# Patient Record
Sex: MALE | Race: WHITE
[De-identification: names, ages, dates, MRNs, and addresses within clinical notes are randomized per-mention and may not be internally consistent; named-entity substitution may affect disease eponyms.]

---

## 2018-01-10 ENCOUNTER — HOSPITAL ENCOUNTER (EMERGENCY)
Dept: HOSPITAL 25 - ED | Age: 58
Discharge: HOME | End: 2018-01-10
Payer: COMMERCIAL

## 2018-01-10 ENCOUNTER — HOSPITAL ENCOUNTER (EMERGENCY)
Dept: HOSPITAL 25 - UCEAST | Age: 58
Discharge: ANOTHER HOSPITAL | End: 2018-01-10
Payer: COMMERCIAL

## 2018-01-10 VITALS — DIASTOLIC BLOOD PRESSURE: 94 MMHG | SYSTOLIC BLOOD PRESSURE: 148 MMHG

## 2018-01-10 VITALS — SYSTOLIC BLOOD PRESSURE: 130 MMHG | DIASTOLIC BLOOD PRESSURE: 86 MMHG

## 2018-01-10 DIAGNOSIS — E86.0: ICD-10-CM

## 2018-01-10 DIAGNOSIS — R10.9: Primary | ICD-10-CM

## 2018-01-10 DIAGNOSIS — R63.0: ICD-10-CM

## 2018-01-10 DIAGNOSIS — R19.7: ICD-10-CM

## 2018-01-10 DIAGNOSIS — R50.9: ICD-10-CM

## 2018-01-10 DIAGNOSIS — I10: ICD-10-CM

## 2018-01-10 DIAGNOSIS — J18.9: Primary | ICD-10-CM

## 2018-01-10 LAB
BASOPHILS # BLD AUTO: 0 10^3/UL (ref 0–0.2)
EOSINOPHIL # BLD AUTO: 0 10^3/UL (ref 0–0.6)
HCT VFR BLD AUTO: 34 % (ref 42–52)
HGB BLD-MCNC: 11.9 G/DL (ref 14–18)
INR PPP/BLD: 1.21 (ref 0.77–1.02)
LYMPHOCYTES # BLD AUTO: 0.3 10^3/UL (ref 1–4.8)
MCH RBC QN AUTO: 31 PG (ref 27–31)
MCHC RBC AUTO-ENTMCNC: 35 G/DL (ref 31–36)
MCV RBC AUTO: 89 FL (ref 80–94)
MONOCYTES # BLD AUTO: 0.6 10^3/UL (ref 0–0.8)
NEUTROPHILS # BLD AUTO: 6.9 10^3/UL (ref 1.5–7.7)
NRBC # BLD AUTO: 0 10^3/UL
NRBC BLD QL AUTO: 0
PLATELET # BLD AUTO: 246 10^3/UL (ref 150–450)
RBC # BLD AUTO: 3.82 10^6/UL (ref 4–5.4)
WBC # BLD AUTO: 7.9 10^3/UL (ref 3.5–10.8)

## 2018-01-10 PROCEDURE — 87502 INFLUENZA DNA AMP PROBE: CPT

## 2018-01-10 PROCEDURE — 71045 X-RAY EXAM CHEST 1 VIEW: CPT

## 2018-01-10 PROCEDURE — 81003 URINALYSIS AUTO W/O SCOPE: CPT

## 2018-01-10 PROCEDURE — G0463 HOSPITAL OUTPT CLINIC VISIT: HCPCS

## 2018-01-10 PROCEDURE — 71275 CT ANGIOGRAPHY CHEST: CPT

## 2018-01-10 PROCEDURE — 80053 COMPREHEN METABOLIC PANEL: CPT

## 2018-01-10 PROCEDURE — 96374 THER/PROPH/DIAG INJ IV PUSH: CPT

## 2018-01-10 PROCEDURE — 36415 COLL VENOUS BLD VENIPUNCTURE: CPT

## 2018-01-10 PROCEDURE — 80074 ACUTE HEPATITIS PANEL: CPT

## 2018-01-10 PROCEDURE — 99285 EMERGENCY DEPT VISIT HI MDM: CPT

## 2018-01-10 PROCEDURE — 85025 COMPLETE CBC W/AUTO DIFF WBC: CPT

## 2018-01-10 PROCEDURE — 85730 THROMBOPLASTIN TIME PARTIAL: CPT

## 2018-01-10 PROCEDURE — 87040 BLOOD CULTURE FOR BACTERIA: CPT

## 2018-01-10 PROCEDURE — 74177 CT ABD & PELVIS W/CONTRAST: CPT

## 2018-01-10 PROCEDURE — 96375 TX/PRO/DX INJ NEW DRUG ADDON: CPT

## 2018-01-10 PROCEDURE — 93005 ELECTROCARDIOGRAM TRACING: CPT

## 2018-01-10 PROCEDURE — 76705 ECHO EXAM OF ABDOMEN: CPT

## 2018-01-10 PROCEDURE — 84484 ASSAY OF TROPONIN QUANT: CPT

## 2018-01-10 PROCEDURE — 83605 ASSAY OF LACTIC ACID: CPT

## 2018-01-10 PROCEDURE — 81015 MICROSCOPIC EXAM OF URINE: CPT

## 2018-01-10 PROCEDURE — 85610 PROTHROMBIN TIME: CPT

## 2018-01-10 PROCEDURE — 99212 OFFICE O/P EST SF 10 MIN: CPT

## 2018-01-10 PROCEDURE — 85379 FIBRIN DEGRADATION QUANT: CPT

## 2018-01-10 PROCEDURE — 87522 HEPATITIS C REVRS TRNSCRPJ: CPT

## 2018-01-10 RX ADMIN — SODIUM CHLORIDE ONE MLS/HR: 900 IRRIGANT IRRIGATION at 14:00

## 2018-01-10 NOTE — UC
Respiratory Complaint HPI





- HPI Summary


HPI Summary: 


Has been feeling badly for 10 days on/off fevers on/off still has soft loose 

stool up to 3 times a day








- History of Current Complaint


Chief Complaint: UCGeneralIllness


Stated Complaint: FLU SYMPTOMS


Time Seen by Provider: 01/10/18 11:02


Hx Obtained From: Patient


Onset/Duration: Gradual Onset


Timing: Constant


Severity Initially: Mild


Severity Currently: Moderate


Pain Intensity: 5


Aggravating Factors: Nothing


Alleviating Factors: Nothing


Associated Signs And Symptoms: Positive: Negative





- Allergies/Home Medications


Allergies/Adverse Reactions: 


 Allergies











Allergy/AdvReac Type Severity Reaction Status Date / Time


 


No Known Allergies Allergy   Verified 01/10/18 10:47














PMH/Surg Hx/FS Hx/Imm Hx


Previously Healthy: No





- Surgical History


Surgical History: None





- Family History


Known Family History: Positive: None





- Social History


Occupation: Employed Full-time


Lives: With Family


Alcohol Use: Weekly - 12 beers on weekends


Substance Use Type: None


Smoking Status (MU): Never Smoked Tobacco





- Immunization History


Most Recent Tetanus Shot: CANT RECALL





Review of Systems


Constitutional: Negative


Skin: Negative


Eyes: Negative


ENT: Negative


Respiratory: Negative


Cardiovascular: Negative


Gastrointestinal: Abdominal Pain, Diarrhea


Genitourinary: Negative


Motor: Negative


Neurovascular: Negative


Musculoskeletal: Negative


Neurological: Negative


Psychological: Negative


Is Patient Immunocompromised?: No


All Other Systems Reviewed And Are Negative: Yes





Physical Exam


Triage Information Reviewed: Yes


Appearance: Well-Nourished, Ill-Appearing, Pain Distress


Vital Signs: 


 Initial Vital Signs











Temp  100.5 F   01/10/18 10:43


 


Pulse  108   01/10/18 10:43


 


Resp  16   01/10/18 10:43


 


BP  128/76   01/10/18 10:43


 


Pulse Ox  97   01/10/18 10:43











Vital Signs Reviewed: Yes


Eye Exam: Normal


Eyes: Positive: Conjunctiva Clear


ENT Exam: Normal


ENT: Positive: Normal ENT inspection, Hearing grossly normal, Pharynx normal, 

TMs normal, Uvula midline.  Negative: Nasal congestion, Nasal drainage, 

Tonsillar swelling, Tonsillar exudate, Trismus, Muffled voice, Hoarse voice, 

Dental tenderness, Sinus tenderness


Dental Exam: Normal


Neck exam: Normal


Neck: Positive: Supple, Nontender, No Lymphadenopathy


Respiratory Exam: Normal


Respiratory: Positive: Chest non-tender, Lungs clear, Normal breath sounds, No 

respiratory distress, No accessory muscle use


Cardiovascular Exam: Normal


Cardiovascular: Positive: No Murmur, Pulses Normal, Brisk Capillary Refill, 

Tachycardia


Abdominal Exam: Normal


Abdomen Description: Positive: Soft, Hepatomegaly, Other: - pain ruq and luq.  

Negative: CVA Tenderness (R), CVA Tenderness (L)


Bowel Sounds: Positive: Present


Musculoskeletal Exam: Normal


Musculoskeletal: Positive: Strength Intact, ROM Intact, No Edema


Neurological Exam: Normal


Neurological: Positive: Alert, Muscle Tone Normal


Psychological Exam: Normal


Skin Exam: Normal


Skin: Positive: Other - face is flush





UC Diagnostic Evaluation





- Laboratory


O2 Sat by Pulse Oximetry: 97


Diagnostic Studies Comment: ua-dark yeloow  +1 Bili, Influenza A/B (-)





Respiratory Course/Dx





- Course


Course Of Treatment: To [facility] by Private care for further evaluation of 

tachycardia and abdomen pain





- Differential Dx/Diagnosis


Provider Diagnoses: abd pain





Discharge





- Discharge Plan


Condition: Fair


Disposition: OTHER


Discharge Disposition Comment: [facility] ED by private car


Referrals: 


Chris Man MD [Primary Care Provider] - 


Additional Instructions: 


Please go directly to emergency department for further evaluation of abdomen 

pain

## 2018-01-10 NOTE — ED
Antonia JEAN Gabriel, scribed for Manny Lujan MD on 01/10/18 at 1343 .





HPI Febrile Illness





- HPI Summary


HPI Summary: 





This patient is a 57 year old M presenting to [facility] with a chief complaint of a 

fever since 12/31/17.  The patient rates the pain 2/10 in severity. Patient 

reports loose/watery stool, dehydration, and loss of appetite. Patient denies 

pain, vomiting, cough, rhinorrhea, rash, and joint swelling. No antibiotic use 

in the past few months but has taken antipyretics, none today. Patient reports 

having only 1 BM per day. 





- History of Current Complaint


Chief Complaint: EDFever


Time Seen by Provider: 01/10/18 13:39


Hx Obtained From: Patient


Onset/Duration: Started Days Ago - 10


Timing: Constant


Pain Intensity: 2


Pain Scale Used: 0-10 Numeric


Alleviating Factors: OTC Medicine


Associated Signs and Symptoms: Diarrhea, Other: - loss of appetite





- Allergy/Home Medications


Allergies/Adverse Reactions: 


 Allergies











Allergy/AdvReac Type Severity Reaction Status Date / Time


 


No Known Allergies Allergy   Verified 01/10/18 10:47











Home Medications: 


 Home Medications





Multivitamins/Minerals TAB* [Theragran/minerals TAB*] 1 tab PO DAILY 01/10/18 [

History Confirmed 01/10/18]











PMH/Surg Hx/FS Hx/Imm Hx


Endocrine/Hematology History: 


   Denies: Hx Diabetes, Hx Thyroid Disease


Cardiovascular History: Reports: Hx Hypertension - no meds


Respiratory History: 


   Denies: Hx Asthma, Hx Chronic Obstructive Pulmonary Disease (COPD)


GI History: 


   Denies: Hx Ulcer


Infectious Disease History: No


Infectious Disease History: Reports: Hx Shingles - july 2013


   Denies: Hx Clostridium Difficile, Hx Hepatitis, Hx Human Immunodeficiency 

Virus (HIV), Hx of Known/Suspected MRSA, History Other Infectious Disease, 

Traveled Outside the [place] in Last 30 Days





- Family History


Known Family History: Positive: Other - Parkinsons 


   Negative: Cardiac Disease, Diabetes, Renal Disease





- Social History


Occupation: Employed Full-time


Lives: With Family


Alcohol Use: Weekly


Substance Use Type: Reports: None


Smoking Status (MU): Never Smoked Tobacco





Review of Systems


Positive: Fever, Other - dehydration 


Positive: Diarrhea, Other - loss of appetite


All Other Systems Reviewed And Are Negative: Yes





Physical Exam


Triage Information Reviewed: Yes


Vital Signs On Initial Exam: 


 Initial Vitals











Temp Pulse Resp BP Pulse Ox


 


 100.9 F   127   16   161/88   94 


 


 01/10/18 13:33  01/10/18 13:33  01/10/18 13:33  01/10/18 13:33  01/10/18 13:33











Vital Signs Reviewed: Yes


Appearance: Positive: Well-Appearing, No Pain Distress


Skin: Positive: Warm, Skin Color Reflects Adequate Perfusion


Head/Face: Positive: Normal Head/Face Inspection


Eyes: Positive: EOMI, Other: - no photophobia


ENT: Positive: Normal ENT inspection, Pharynx normal.  Negative: Nasal 

congestion


Neck: Positive: Supple, Nontender


Respiratory/Lung Sounds: Positive: Clear to Auscultation, Breath Sounds Present


Cardiovascular: Positive: Tachycardia.  Negative: Murmur


Abdomen Description: Positive: Nontender


Musculoskeletal: Positive: Strength/ROM Intact


Neurological: Positive: Sensory/Motor Intact, Alert, Oriented to Person Place, 

Time, CN Intact II-III, Speech Normal


Psychiatric: Positive: Normal





- [place] Coma Scale


Best Eye Response: 4 - Spontaneous


Best Motor Response: 6 - Obeys Commands


Best Verbal Response: 5 - Oriented





Diagnostics





- Vital Signs


 Vital Signs











  Temp Pulse Resp BP Pulse Ox


 


 01/10/18 13:33  100.9 F  127  16  161/88  94














- Laboratory


Result Diagrams: 


 01/10/18 14:27





 01/10/18 14:27


Lab Statement: Any lab studies that have been ordered have been reviewed, and 

results considered in the medical decision making process.





- Radiology


  ** CXR


Radiology Interpretation Completed By: Radiologist





- CT


  ** CTA Chest/ABD/Pelvis


CT Interpretation Completed By: Radiologist -  1.  Left lower lobe pneumonia. 

Suggest follow-up. 2.  Old granulomatous disease, unchanged. 3.  No CT evidence 

of acute pulmonary embolic disease. 4.  Hepatosplenomegaly. 5.  Moderate 

diverticula without CT evidence of acute diverticulitis.  ED physician has 

reviewed this radiology report.





- EKG


  ** 13:45


Cardiac Rate: Tachycardia


EKG Rhythm: Sinus Tachycardia - at 119 BPM


EKG Interpretation: No STEMI, normal RI, low voltage in extremity leads





- Additional Comments


Diagnostic Additional Comments: 





ABD US reveals, per radiologist, CHOLELITHIASIS. NO DUCTAL DILATATION 


ED physician has reviewed this radiology report.








Re-Evaluation





- Re-Evaluation


  ** First Eval


Re-Evaluation Time: 17:31


Change: Improved


Comment: Patient feel smuch better, HR down, walking well.  Would like to go 

home.





Course/Dx





- Course


Course Of Treatment: 57 yr old with lllobe pneumonia.  With the GI symptoms and 

the presenting sodium of 130 will cover for atypical pnemonia with zithromax, 

and also put him on ceftin.  FU with PMD.





- Diagnoses


Provider Diagnoses: 


 Pneumonia, Hypertension








Discharge





- Discharge Plan


Condition: Good


Disposition: HOME


Prescriptions: 


Azithromycin TAB* [Zithromax TAB (Z-OBINNA) 250 mg #6 tabs] 2 tab PO .TODAY, THEN 

1 DAILY #1 obinna


ceFUROXime TAB(*) [Ceftin  MG(*)] 500 mg PO BID #20 tab


Patient Education Materials:  Hypertension (ED), Pneumonia (ED)


Referrals: 


Chris Man MD [Primary Care Provider] - 2 Days





The documentation as recorded by the Antonia boone Gabriel accurately reflects 

the service I personally performed and the decisions made by me, Manny Lujan MD.

## 2018-01-10 NOTE — RAD
Indication: Fever.



Single frontal view of the chest performed at 1357 hours was reviewed.



Comparison is made with previous exam dated June 22, 2011.



No mediastinal shift is noted. Heart is normal size and configuration. Left upper lobe

nodules are noted. No significant change is noted since June 22, 2011



IMPRESSION: NO ACTIVE CARDIOPULMONARY DISEASE IS NOTED. LEFT UPPER LOBE NODULE LIKELY

REPRESENTING CALCIFIED GRANULOMA STABLE SINCE 622 LEFT

## 2018-01-10 NOTE — RAD
INDICATION: ] Right upper quadrant pain     



COMPARISON: None

 

TECHNIQUE: Longitudinal and transverse scans of the right upper quadrant were obtained.

Doppler interrogation of the hepatic and portal venous system was performed.



FINDINGS:



Liver: The liver is normal in size and echogenicity. There are no focal masses. The liver

measures 14.1 cm in cephalocaudal dimension.



Vessels: There is normal hepatic and portal venous flow.



Bile ducts: There is no evidence of intrahepatic or extrahepatic ductal dilatation.  The

common duct measures 0.3 cm.



Gallbladder: There are multiple gallstones. There is no thickening gallbladder wall or

pericholecystic fluid. There is no evidence of cholelithiasis, thickening of the

gallbladder wall, or pericholecystic fluid.



Pancreas: The visualized pancreas appears normal



Right kidney: The right kidney is normal in size and echogenicity. There are no masses or

calculi. There is no evidence of hydronephrosis. The right kidney measures 10.8 x 5.3 x

4.9 cm.



IVC and aorta: The aorta and superior vena cava appear normal.



Fluid: There is no ascites.



Other: None.



IMPRESSION:  CHOLELITHIASIS. NO DUCTAL DILATATION